# Patient Record
Sex: FEMALE | Race: OTHER | URBAN - NONMETROPOLITAN AREA
[De-identification: names, ages, dates, MRNs, and addresses within clinical notes are randomized per-mention and may not be internally consistent; named-entity substitution may affect disease eponyms.]

---

## 2017-08-01 NOTE — PATIENT DISCUSSION
Vitreous opacities are impairing the vision per pt's complaints but stressed to pt that VA has already been poor for a significant time so surgery will NOT improve VA, it will only remove opacity.  sx vs observe discussed, at this point since symptoms are fairly new(3-4 weeks) will observe and if symptoms worsen consider vitrectomy.

## 2017-08-01 NOTE — PATIENT DISCUSSION
We reviewed the pathophysiology of posterior vitreous detachment and the occasional association with retinal tear and retinal detachment.

## 2019-02-28 NOTE — PROCEDURE NOTE: CLINICAL
PROCEDURE NOTE: Punctal Plugs, Silicone #1 Right Upper Lid. Anesthesia: Topical. Prep: Antibiotic Drops q 5min x 3. Prior to treatment, the risks/benefits/alternatives were discussed. The patient wished to proceed with procedure. Permanent silicone plugs were inserted. Patient tolerated procedure well. There were no complications. Post procedure instructions given. Size . 5. Mickie Stringer

## 2019-02-28 NOTE — PATIENT DISCUSSION
Preservative free artificial tears 6 times per day or more and lubricating ointment qhs to the right eye.

## 2020-04-27 NOTE — PATIENT DISCUSSION
4/27/2020:  more near work lately bc stuck at [employer] lubricating gel/ointment 6-8x/day and needs CONSISTENTLY do NOT treat to symptoms.

## 2020-05-07 NOTE — PATIENT DISCUSSION
Post-op instructions given. Discussed drops-Start Pred QID, Moxi QID, positioning-Do Not Lay on Back, Head face down 50 minutes every hour, no strenuous activity and eye protection. Call immediately if eye pain or loss of vision.

## 2020-05-07 NOTE — PATIENT DISCUSSION
Avoid exposure of the eye to non-sterile fluid such as shower/bath water.  No touching or rubbing the eye.

## 2020-05-13 NOTE — PATIENT DISCUSSION
Post-op instructions given. Discussed drops-continue Pred QID & leigh ann QDAY, D/C Moxi QID, positioning-Do Not Lay on Back, Head face down 50 minutes every hour, no strenuous activity and eye protection. Call immediately if eye pain or loss of vision.

## 2020-05-29 NOTE — PATIENT DISCUSSION
Post-op instructions given. Discussed drops-continue Pred QID & leigh ann QDAY, positioning-Do Not Lay on Back, Head face down 50 minutes every hour, no strenuous activity and eye protection. Call immediately if eye pain or loss of vision.

## 2020-05-29 NOTE — PATIENT DISCUSSION
Patient has lagophthalmos, neurotropic, patient refered to Dr. Flaco Dorado for +/- placement of Prokera lens today.

## 2020-05-29 NOTE — PATIENT DISCUSSION
Patient has lagophthalmos, neurotropic, patient refered to Dr. Mikael Burgos for +/- placement of Prokera lens today.

## 2020-06-01 NOTE — PROCEDURE NOTE: CLINICAL
PROCEDURE NOTE: Bandage Contact Lens A therapeutic soft contact lens of the of the appropriate size and base curve was selected and then applied to the cornea. The lens fit well and moved appropriately. Dom Samuel

## 2020-06-17 NOTE — PATIENT DISCUSSION
Post-op instructions given. Discussed drops-continue Pred BID, Stop Ofloxacin,  Start Eythromycin BID until gone, Call immediately if eye pain or loss of vision.

## 2020-06-17 NOTE — PATIENT DISCUSSION
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  Poor candidate for re-op.  Defer SX.

## 2020-06-17 NOTE — PATIENT DISCUSSION
Post op Week 6-No Eye Pain, Recurrent RD due to not following positioning, Poor candidate for Re op-defer SX.

## 2020-06-25 NOTE — PROCEDURE NOTE: CLINICAL
PROCEDURE NOTE: Bandage Contact Lens #1 OD. Diagnosis: Neurotrophic Keratoconjunctivitis. A therapeutic soft contact lens of the of the appropriate size and base curve was selected and then applied to the cornea. The lens fit well and moved appropriately. Chey Godfrey

## 2020-07-16 NOTE — PATIENT DISCUSSION
Increase inflammation, no evidence of bacterial infection, Extensive NV, poor visual potential, Dr. Estrella Avila next available-Enucleation.  Continue Durezol QID, Start Cyclogyl BID.

## 2020-07-22 NOTE — PATIENT DISCUSSION
Option 1 Recommend Evisceration OD. discussed risks  vs benefits with sx. 18 mm silicon/porous ball. small/med conformer.

## 2020-07-22 NOTE — PATIENT DISCUSSION
Discussed risks/benefits and alternatives to surgery. recommend canthoplasty if needed Jpf will evaluate in surgery.

## 2020-07-22 NOTE — PATIENT DISCUSSION
Increase inflammation, no evidence of bacterial infection, Extensive NV, poor visual potential, Dr. Davis Proper next available-Enucleation.  Continue Durezol QID, Start Cyclogyl BID.

## 2020-07-22 NOTE — PATIENT DISCUSSION
discussed all options with pt and her  and son was on the phone with his concerns. Jpf explained that he could not make the decision for them that this was for the family to decide. Pt and her  wanted to proceed with sx with JPF. pt was going to discuss it with her PCP asap.

## 2020-07-29 NOTE — PATIENT DISCUSSION
Increase inflammation, no evidence of bacterial infection, Extensive NV, poor visual potential, Dr. Mike Meier next available-Enucleation.  Continue Durezol QID, Start Cyclogyl BID.

## 2020-08-18 NOTE — PROCEDURE NOTE: SURGICAL
"<p><strong>&nbsp;</strong></p><p><strong>PREOPERATIVE DIAGNOSIS</strong>: Blind, painful, right&nbsp;eye.</p><p>&nbsp;</p><p><strong>POSTOPERATIVE DIAGNOSIS</strong>: <span>&nbsp;</span>Same</p><p>&nbsp;</p><p style=""margin-left:3.0in;text-indent:-3.0in;tab-stops:-1.0in;""><strong>PROCEDURE:</strong> 1. Cornea off evisceration of right eye&nbsp; &nbsp;</p><p style=""margin-left:3.0in;text-indent:-3.0in;tab-stops:-1.0in;"">2.  Orbital implant with Porex 18 mm ball ritht eye

## 2020-08-18 NOTE — PATIENT DISCUSSION
Increase inflammation, no evidence of bacterial infection, Extensive NV, poor visual potential, Dr. Zaira Arambula next available-Enucleation.  Continue Durezol QID, Start Cyclogyl BID.

## 2020-08-26 NOTE — PATIENT DISCUSSION
Healing well. Continue drops BID x 10 days. Conformer will stay in place. Tarsorraphy removed 8/26/20. Return in 4 weeks with Dr. Maria M Alegre. Patient will see Tamara Jay, ,  in 6 weeks for a prosthetic eye.

## 2020-08-26 NOTE — PATIENT DISCUSSION
Increase inflammation, no evidence of bacterial infection, Extensive NV, poor visual potential, Dr. Keyla Lake next available-Enucleation.  Continue Durezol QID, Start Cyclogyl BID.

## 2020-10-07 NOTE — PATIENT DISCUSSION
Advised to call immediately if any worsening distortion or blurring is noted. Preeclampsia in postpartum period

## 2020-10-07 NOTE — PATIENT DISCUSSION
Healing well. Continue drops BID x 10 days. Conformer will stay in place. Tarsorraphy removed 8/26/20. Patient will see Christy Jay, , for a prosthetic eye. Rx for prosthetic was given 10/07/20. Pt will follow up with Dr. Janna Bloch 4-6 weeks after seeing Christy Jay for prosthetic. Pt will call to schedule next appt with HELENA.

## 2020-11-25 NOTE — PATIENT DISCUSSION
Healing well. Rx written for Tobradex, preventatively, if pt notices any discharge or infection starting. Prosthetic fits well. Discussed removing prosthetic once per month for cleaning. Worley Goldberg Minor did a great job with the prosthetic. Return to Dr. Powers Prudent as scheduled or needed.

## 2020-11-25 NOTE — PATIENT DISCUSSION
Increase inflammation, no evidence of bacterial infection, Extensive NV, poor visual potential, Dr. Hillary Morales next available-Enucleation.  Continue Durezol QID, Start Cyclogyl BID.

## 2021-04-01 NOTE — PATIENT DISCUSSION
Healing well. Rx written for Tobradex, preventatively, if pt notices any discharge or infection starting. Prosthetic fits well. Discussed removing prosthetic once per month for cleaning. Lizett Jay did a great job with the prosthetic. Return to Dr. Deshawn Garcia as scheduled or needed.

## 2021-04-01 NOTE — PATIENT DISCUSSION
Increase inflammation, no evidence of bacterial infection, Extensive NV, poor visual potential, Dr. Cruz Harden next available-Enucleation.  Continue Durezol QID, Start Cyclogyl BID.

## 2021-10-05 NOTE — PATIENT DISCUSSION
Healing well. Rx written for Tobradex, preventatively, if pt notices any discharge or infection starting. Prosthetic fits well. Discussed removing prosthetic once per month for cleaning. Sheryl Jay did a great job with the prosthetic. Return to Dr. Kevin Anna as scheduled or needed.

## 2022-03-01 ENCOUNTER — NEW PATIENT (OUTPATIENT)
Dept: URBAN - NONMETROPOLITAN AREA CLINIC 5 | Facility: CLINIC | Age: 77
End: 2022-03-01

## 2022-03-01 DIAGNOSIS — H02.834: ICD-10-CM

## 2022-03-01 DIAGNOSIS — H02.831: ICD-10-CM

## 2022-03-01 DIAGNOSIS — H11.132: ICD-10-CM

## 2022-03-01 PROCEDURE — 92082 INTERMEDIATE VISUAL FIELD XM: CPT

## 2022-03-01 PROCEDURE — 99204 OFFICE O/P NEW MOD 45 MIN: CPT

## 2022-03-01 PROCEDURE — 92285 EXTERNAL OCULAR PHOTOGRAPHY: CPT

## 2022-03-01 ASSESSMENT — VISUAL ACUITY
OD_SC: 20/30+1
OS_SC: 20/30+1

## 2022-03-01 NOTE — PATIENT DISCUSSION
The excess upper eyelid tissue folds downward towards or onto the lid margin causing impairment of the peripheral visual field.

## 2022-03-01 NOTE — PATIENT DISCUSSION
RECOMMEND BIOPSY OF CARUNCLE, OS.  Discussed r/b of the procedure with the patient.  Would recommend biopsy in OR at time of blepharoplasty.  Patient expressed understanding and will procee at her convenience.

## 2022-04-04 NOTE — PATIENT DISCUSSION
Healing well. Rx written for Tobradex, preventatively, if pt notices any discharge or infection starting. Prosthetic fits well. Discussed removing prosthetic once per month for cleaning. Chucho Jay did a great job with the prosthetic. Return to Dr. Ronni Sharp as scheduled or needed.

## 2022-07-28 NOTE — PATIENT DISCUSSION
Healing well. Rx written for Tobradex, preventatively, if pt notices any discharge or infection starting. Prosthetic fits well. Discussed removing prosthetic once per month for cleaning. Reina Jay did a great job with the prosthetic. Return to Dr. Philip Jones as scheduled or needed.

## 2023-01-12 NOTE — PATIENT DISCUSSION
Patient given Rx for glasses. Z Plasty Text: The lesion was extirpated to the level of the fat with a #15 scalpel blade.  Given the location of the defect, shape of the defect and the proximity to free margins a Z-plasty was deemed most appropriate for repair.  Using a sterile surgical marker, the appropriate transposition arms of the Z-plasty were drawn incorporating the defect and placing the expected incisions within the relaxed skin tension lines where possible.    The area thus outlined was incised deep to adipose tissue with a #15 scalpel blade.  The skin margins were undermined to an appropriate distance in all directions utilizing iris scissors.  The opposing transposition arms were then transposed into place in opposite direction and anchored with interrupted buried subcutaneous sutures.

## 2023-10-17 NOTE — PATIENT DISCUSSION
Patient understands condition, prognosis and need for follow up care. Subjective   Patient ID: Renetta Chauhan is a 20 y.o. female who presents for virtual.  Chief Complaint_[facility]:  An interactive audio and video telecommunication system which permits real time communications between the patient (at the originating site) and provider (at the distant site) was utilized to provide this telehealth service.   Virtual or Telephone Consent    An interactive audio and video telecommunication system which permits real time communications between the patient (at the originating site) and provider (at the distant site) was utilized to provide this telehealth service.   Verbal consent was requested and obtained from Renetta Chauhan on this date, 10/17/23 for a telehealth visit.    HPI   Pt has been ill for 2 d    Took neg covid test this am  F, chills, sinus pressure st, and deep cough w wheezing she is out of Proair  Review of Systems   HENT:  Positive for congestion and sinus pain.    Respiratory:  Positive for cough and wheezing.    All other systems reviewed and are negative.      Objective   There were no vitals taken for this visit.    Physical Exam  Constitutional:       Appearance: She is ill-appearing.   HENT:      Head: Normocephalic.      Nose: Congestion present.   Pulmonary:      Effort: Pulmonary effort is normal.   Neurological:      Mental Status: She is alert and oriented to person, place, and time.   Psychiatric:         Mood and Affect: Mood normal.         Behavior: Behavior normal.         Assessment/Plan   Diagnoses and all orders for this visit:  Moderate persistent asthma, unspecified whether complicated  -     albuterol 90 mcg/actuation inhaler; Inhale 2 puffs every 4 hours if needed for wheezing.  -     methylPREDNISolone (Medrol Dospak) 4 mg tablets; Take 1 tablet (4 mg) by mouth 1 time for 1 dose.  Acute non-recurrent maxillary sinusitis  -     cefdinir (Omnicef) 300 mg capsule; Take 2 capsules (600 mg) by mouth once daily for 10 days.